# Patient Record
Sex: MALE | Race: WHITE | NOT HISPANIC OR LATINO | URBAN - METROPOLITAN AREA
[De-identification: names, ages, dates, MRNs, and addresses within clinical notes are randomized per-mention and may not be internally consistent; named-entity substitution may affect disease eponyms.]

---

## 2024-03-21 ENCOUNTER — HOSPITAL ENCOUNTER (EMERGENCY)
Facility: HOSPITAL | Age: 44
Discharge: HOME | End: 2024-03-21
Attending: EMERGENCY MEDICINE
Payer: COMMERCIAL

## 2024-03-21 VITALS
BODY MASS INDEX: 27.09 KG/M2 | SYSTOLIC BLOOD PRESSURE: 113 MMHG | RESPIRATION RATE: 16 BRPM | HEIGHT: 72 IN | TEMPERATURE: 97.5 F | DIASTOLIC BLOOD PRESSURE: 78 MMHG | WEIGHT: 200 LBS | OXYGEN SATURATION: 99 % | HEART RATE: 84 BPM

## 2024-03-21 DIAGNOSIS — T40.2X1A OPIOID OVERDOSE, ACCIDENTAL OR UNINTENTIONAL, INITIAL ENCOUNTER: Primary | ICD-10-CM

## 2024-03-21 DIAGNOSIS — T68.XXXA HYPOTHERMIA, INITIAL ENCOUNTER: ICD-10-CM

## 2024-03-21 DIAGNOSIS — F19.10 POLYSUBSTANCE ABUSE: ICD-10-CM

## 2024-03-21 LAB
ALBUMIN SERPL-MCNC: 4.1 G/DL (ref 3.5–5.2)
ALBUMIN/GLOB SERPL: 1.4 G/DL
ALP SERPL-CCNC: 390 U/L (ref 39–117)
ALT SERPL W P-5'-P-CCNC: 353 U/L (ref 1–41)
AMPHET+METHAMPHET UR QL: POSITIVE
ANION GAP SERPL CALCULATED.3IONS-SCNC: 12 MMOL/L (ref 5–15)
AST SERPL-CCNC: 257 U/L (ref 1–40)
BARBITURATES UR QL SCN: NEGATIVE
BASOPHILS # BLD AUTO: 0.04 10*3/MM3 (ref 0–0.2)
BASOPHILS NFR BLD AUTO: 0.3 % (ref 0–1.5)
BENZODIAZ UR QL SCN: NEGATIVE
BILIRUB SERPL-MCNC: 1.2 MG/DL (ref 0–1.2)
BUN SERPL-MCNC: 28 MG/DL (ref 6–20)
BUN/CREAT SERPL: 20 (ref 7–25)
CALCIUM SPEC-SCNC: 9 MG/DL (ref 8.6–10.5)
CANNABINOIDS SERPL QL: NEGATIVE
CHLORIDE SERPL-SCNC: 97 MMOL/L (ref 98–107)
CO2 SERPL-SCNC: 26 MMOL/L (ref 22–29)
COCAINE UR QL: NEGATIVE
CREAT SERPL-MCNC: 1.4 MG/DL (ref 0.76–1.27)
DEPRECATED RDW RBC AUTO: 37.6 FL (ref 37–54)
EGFRCR SERPLBLD CKD-EPI 2021: 63.6 ML/MIN/1.73
EOSINOPHIL # BLD AUTO: 0.1 10*3/MM3 (ref 0–0.4)
EOSINOPHIL NFR BLD AUTO: 0.7 % (ref 0.3–6.2)
ERYTHROCYTE [DISTWIDTH] IN BLOOD BY AUTOMATED COUNT: 11.8 % (ref 12.3–15.4)
ETHANOL BLD-MCNC: <10 MG/DL (ref 0–10)
ETHANOL UR QL: <0.01 %
FENTANYL UR-MCNC: POSITIVE NG/ML
GLOBULIN UR ELPH-MCNC: 2.9 GM/DL
GLUCOSE BLDC GLUCOMTR-MCNC: 216 MG/DL (ref 70–130)
GLUCOSE SERPL-MCNC: 191 MG/DL (ref 65–99)
HCT VFR BLD AUTO: 41.5 % (ref 37.5–51)
HGB BLD-MCNC: 13.5 G/DL (ref 13–17.7)
IMM GRANULOCYTES # BLD AUTO: 0.11 10*3/MM3 (ref 0–0.05)
IMM GRANULOCYTES NFR BLD AUTO: 0.8 % (ref 0–0.5)
LYMPHOCYTES # BLD AUTO: 0.85 10*3/MM3 (ref 0.7–3.1)
LYMPHOCYTES NFR BLD AUTO: 5.9 % (ref 19.6–45.3)
MCH RBC QN AUTO: 28.4 PG (ref 26.6–33)
MCHC RBC AUTO-ENTMCNC: 32.5 G/DL (ref 31.5–35.7)
MCV RBC AUTO: 87.2 FL (ref 79–97)
METHADONE UR QL SCN: NEGATIVE
MONOCYTES # BLD AUTO: 0.64 10*3/MM3 (ref 0.1–0.9)
MONOCYTES NFR BLD AUTO: 4.5 % (ref 5–12)
NEUTROPHILS NFR BLD AUTO: 12.57 10*3/MM3 (ref 1.7–7)
NEUTROPHILS NFR BLD AUTO: 87.8 % (ref 42.7–76)
NRBC BLD AUTO-RTO: 0 /100 WBC (ref 0–0.2)
OPIATES UR QL: NEGATIVE
OXYCODONE UR QL SCN: NEGATIVE
PLATELET # BLD AUTO: 283 10*3/MM3 (ref 140–450)
PMV BLD AUTO: 9.1 FL (ref 6–12)
POTASSIUM SERPL-SCNC: 5 MMOL/L (ref 3.5–5.2)
PROT SERPL-MCNC: 7 G/DL (ref 6–8.5)
RBC # BLD AUTO: 4.76 10*6/MM3 (ref 4.14–5.8)
SODIUM SERPL-SCNC: 135 MMOL/L (ref 136–145)
WBC NRBC COR # BLD AUTO: 14.31 10*3/MM3 (ref 3.4–10.8)

## 2024-03-21 PROCEDURE — 80307 DRUG TEST PRSMV CHEM ANLYZR: CPT | Performed by: EMERGENCY MEDICINE

## 2024-03-21 PROCEDURE — 99283 EMERGENCY DEPT VISIT LOW MDM: CPT

## 2024-03-21 PROCEDURE — 36415 COLL VENOUS BLD VENIPUNCTURE: CPT

## 2024-03-21 PROCEDURE — 80053 COMPREHEN METABOLIC PANEL: CPT | Performed by: EMERGENCY MEDICINE

## 2024-03-21 PROCEDURE — 82077 ASSAY SPEC XCP UR&BREATH IA: CPT | Performed by: EMERGENCY MEDICINE

## 2024-03-21 PROCEDURE — 85025 COMPLETE CBC W/AUTO DIFF WBC: CPT | Performed by: EMERGENCY MEDICINE

## 2024-03-21 PROCEDURE — 82948 REAGENT STRIP/BLOOD GLUCOSE: CPT

## 2024-03-21 RX ORDER — NALOXONE HYDROCHLORIDE 4 MG/.1ML
1 SPRAY NASAL AS NEEDED
Qty: 2 EACH | Refills: 0 | Status: SHIPPED | OUTPATIENT
Start: 2024-03-21

## 2024-03-21 NOTE — ED PROVIDER NOTES
EMERGENCY DEPARTMENT ENCOUNTER    Room Number:  22/22  PCP: Provider, No Known  Historian: Patient, EMS      HPI:  Chief Complaint: Found down, suspected drug overdose  A complete HPI/ROS/PMH/PSH/SH/FH are unobtainable due to: Altered mental status  Context: Alonso Carrillo is a 44 y.o. male with a history of substance abuse who presents to the ED c/o acute altered mental status.  Per EMS, patient was found unconscious in front of a convenience store.  He was reportedly unresponsive and apneic.  He was given a total of 8 mg of intranasal Narcan by bystanders.  Bystander CPR was initiated.  When fire department arrived on scene, patient had a pulse but was still unresponsive.  He was given another 4 mg of intranasal Narcan.  When EMS arrived on scene, patient was awake and complained of being cold.  Patient mitts to using heroin earlier today.  He is unsure what time he used.  Denies other drug or alcohol use.  He complains of feeling cold.  Denies headache, chest pain, shortness of breath, abdominal pain, vomiting, or diarrhea.  Temperature was 93.4 at triage.  It is unknown how long he was outside.            PAST MEDICAL HISTORY  Active Ambulatory Problems     Diagnosis Date Noted    No Active Ambulatory Problems     Resolved Ambulatory Problems     Diagnosis Date Noted    No Resolved Ambulatory Problems     No Additional Past Medical History         PAST SURGICAL HISTORY  History reviewed. No pertinent surgical history.      FAMILY HISTORY  History reviewed. No pertinent family history.      SOCIAL HISTORY  Social History     Socioeconomic History    Marital status: Unknown         ALLERGIES  Patient has no known allergies.    REVIEW OF SYSTEMS  Review of Systems  Included in HPI  All systems reviewed and negative except for those discussed in HPI.      PHYSICAL EXAM  ED Triage Vitals [03/21/24 1613]   Temp Heart Rate Resp BP SpO2   93.4 °F (34.1 °C) 108 (!) 30 125/78 100 %      Temp src Heart Rate Source Patient  Position BP Location FiO2 (%)   Axillary -- -- -- --       Physical Exam      GENERAL: Awake and alert.  Oriented to self, day, and month.  Nontoxic-appearing male.  HENT: NCAT, nares patent  EYES: PERRL, EOMI  CV: regular rhythm, mildly tachycardic  RESPIRATORY: normal effort, clear to auscultation bilaterally  ABDOMEN: soft, nondistended, nontender  MUSCULOSKELETAL: Extremities are nontender with full range of motion  NEURO: Speech is normal.  No facial droop.  Follows commands  PSYCH:  calm, cooperative  SKIN: warm, dry    Vital signs and nursing notes reviewed.          LAB RESULTS  No results found for this or any previous visit (from the past 24 hour(s)).    Ordered the above labs and reviewed the results.        RADIOLOGY  No Radiology Exams Resulted Within Past 24 Hours    Ordered the above noted radiological studies. Reviewed by me in PACS.            PROCEDURES  Procedures        OUTPATIENT MEDICATION MANAGEMENT:  No current Epic-ordered facility-administered medications on file.     Current Outpatient Medications Ordered in Epic   Medication Sig Dispense Refill    naloxone (NARCAN) 4 MG/0.1ML nasal spray 1 spray into the nostril(s) as directed by provider As Needed for Opioid Reversal. 2 each 0           MEDICATIONS GIVEN IN ER  Medications - No data to display                MEDICAL DECISION MAKING, PROGRESS, and CONSULTS    All labs have been independently reviewed by me.  All radiology studies have been reviewed by me and I have also reviewed the radiology report.   EKG's independently viewed and interpreted by me.  Discussion below represents my analysis of pertinent findings related to patient's condition, differential diagnosis, treatment plan and final disposition.      Additional sources:    - Discussed/ obtained information from independent historians: EMS      - External (non-ED) record review: There are no prior records in the EMR    -Prescription drug monitoring program review:     N/A    -  Chronic or social conditions impacting patient care (Social Determinants of Health): Health Care System (Health Coverage, Provider Availability, Provider Linguistic and Cultural Competency, Quality of Care) patient does not have a primary care provider          Orders placed during this visit:  Orders Placed This Encounter   Procedures    Comprehensive Metabolic Panel    Ethanol    Urine Drug Screen - Urine, Clean Catch    CBC Auto Differential    Continuous Pulse Oximetry    Monitor Blood Pressure    Check Temperature    Ambulate patient    POC Glucose Once    POC Glucose Once    CBC & Differential         Additional orders considered but not ordered:          Differential diagnosis includes, but is not limited to:    Hypothermia, drug intoxication, seizure, electrolyte abnormality      Independent interpretation of labs, radiology studies, and discussions with consultants:  ED Course as of 03/23/24 0752   Thu Mar 21, 2024   1628 Temp: 93.4 °F (34.1 °C) [WH]   1724 Glucose(!): 191 [WH]   1724 BUN(!): 28 [WH]   1724 Creatinine(!): 1.40 [WH]   1724 Sodium(!): 135 [WH]   1724 WBC(!): 14.31 [WH]   1724 Hemoglobin: 13.5 [WH]   1724 Ethanol: <10 [WH]   1835 Temp: 97.5 °F (36.4 °C) [WH]   1838 Patient is sleeping but easily awakened.  Vital signs remained stable.  Urine drug screen is positive for amphetamines and fentanyl.  Patient will continue to be observed until he is more awake. [WH]   2119 The patient is awake and alert.  He denies any suicidal ideation.  He states he has somewhere to stay.  He does not have anyone to pick him up and he wants to walk back to where he lives.  He has no hypoxia on room air.  He is oriented.  Plan discharge. [TR]      ED Course User Index  [TR] Chirag Dawkins MD  [WH] Alonso Waters MD         COMPLEXITY OF CARE        DIAGNOSIS  Final diagnoses:   Opioid overdose, accidental or unintentional, initial encounter   Polysubstance abuse   Hypothermia, initial encounter          DISPOSITION  DISCHARGE    Patient discharged in stable condition.    Reviewed implications of results, diagnosis, meds, responsibility to follow up, warning signs and symptoms of possible worsening, potential complications and reasons to return to ER    Patient/Family voiced understanding of above instructions.    Discussed plan for discharge, as there is no emergent indication for admission. Patient referred to primary care provider for BP management due to today's BP. Pt/family is agreeable and understands need for follow up and repeat testing.  Pt is aware that discharge does not mean that nothing is wrong but it indicates no emergency is present that requires admission and they must continue care with follow-up as given below or physician of their choice.     FOLLOW-UP  [facility] EMERGENCY DEPARTMENT  [address] [facility] [address] [phone]    As needed    [address] [address]  [facility] [address] [phone]  Schedule an appointment as soon as possible for a visit            Medication List        New Prescriptions      naloxone 4 MG/0.1ML nasal spray  Commonly known as: NARCAN  1 spray into the nostril(s) as directed by provider As Needed for Opioid Reversal.               Where to Get Your Medications        These medications were sent to [facility] [place]  [address] [facility], [facility] [address]      Hours: Monday to Friday 7 AM to 6 PM, Saturday & Sunday 8 AM to 4:30 PM (Closed 12 PM to 12:30 PM) Phone: [phone]   naloxone 4 MG/0.1ML nasal spray                   Latest Documented Vital Signs:  AS OF 07:52 EDT VITALS:    BP - 113/78  HR - 84  TEMP - 97.5 °F (36.4 °C) (Tympanic)  O2 SATS - 99%            --    Please note that portions of this were completed with a voice recognition program.       Note Disclaimer: At [facility], we believe that sharing information builds trust and better  relationships. You are receiving this note because you are receiving care at [facility] or recently visited. It is possible you will see health information before a provider has talked with you about it. This kind of information can be easy to misunderstand. To help you fully understand what it means for your health, we urge you to discuss this note with your provider.             Alonso Waters MD  03/23/24 0752

## 2024-03-21 NOTE — ED TRIAGE NOTES
Patient to ED via EMS from [place] on [address]. Bystanders found patient unresponsive and apneic. Patient received 8mg narcan intrasal and bystanders initiated CPR prior to EMS arrival. [place] fire arrived and administered 4mg more of narcan and confirmed pulse and stopped CPR. Patient overdosed on opiate.

## 2024-03-22 NOTE — DISCHARGE INSTRUCTIONS
Seek medical help to quit using substances.  Return immediately for any thoughts of harming yourself or others.  Follow-up with your primary care doctor for further evaluation.